# Patient Record
Sex: MALE | Race: WHITE
[De-identification: names, ages, dates, MRNs, and addresses within clinical notes are randomized per-mention and may not be internally consistent; named-entity substitution may affect disease eponyms.]

---

## 2019-11-29 NOTE — ED PHYSICIAN DOCUMENTATION
PD HPI MHE





- Stated complaint


Stated Complaint: ALOC





- Chief complaint


Chief Complaint: General





- History obtained from


History obtained from: Patient, EMS





- History of Present Illness


Primary symptom: Psychosis


Timing - onset: Unknown


Contributing factors: Other (homeless unable to contact family)


Similar symptoms before: Diagnosis (schizoaffective and bipolar)


Recently seen: Not recently seen





- Additional information


Additional information: 





29-year-old male is unable to give a clear picture of why he is in the emergency

department he is homeless and he appears sad.  He is complaining of a headache 

and he is hungry and tired.





Review of Systems


Constitutional: reports: Fatigue.  denies: Fever


Eyes: denies: Decreased vision


Ears: denies: Ear pain


Nose: reports: Congestion


Cardiac: denies: Chest pain / pressure, Palpitations


Respiratory: reports: Cough.  denies: Dyspnea


GI: denies: Vomiting





PD PAST MEDICAL HISTORY





- Past Medical History


Past Medical History: Yes


Cardiovascular: None


Respiratory: Asthma


Neuro: None


Endocrine/Autoimmune: None


GI: None


: None


HEENT: None


Psych: Bipolar disorder, Schizophrenia


Musculoskeletal: None, Chronic back pain


Derm: None





- Past Surgical History


Past Surgical History: Yes


General: Other


Ortho: Other





- Allergies


Allergies/Adverse Reactions: 


                                    Allergies











Allergy/AdvReac Type Severity Reaction Status Date / Time


 


orange AdvReac  Unknown Verified 11/29/19 20:45














- Social History


Does the pt smoke?: No


Smoking Status: Never smoker


Does the pt drink ETOH?: No


Does the pt have substance abuse?: No





- Immunizations


Immunizations are current?: No





- POLST


Patient has POLST: No





PD ED PE NORMAL





- Vitals


Vital signs reviewed: Yes (hypertensive )





- General


General: Well developed/nourished, Other (super sad affect with very quiet 

voice. )





- HEENT


HEENT: Atraumatic, PERRL, EOMI, Other (dry mucuos membranes Right TM is inflamed

with indistinct landmarks and the left is less involved with only mild umbo 

rounding.)





- Neck


Neck: Supple, no meningeal sign, No bony TTP





- Cardiac


Cardiac: RRR, No murmur





- Respiratory


Respiratory: No respiratory distress, Clear bilaterally





- Abdomen


Abdomen: Soft, Non tender





- Back


Back: No CVA TTP, No spinal TTP





- Derm


Derm: Normal color, Warm and dry, No rash





- Extremities


Extremities: No deformity, Other (trace edema to the feet bilaterally )





- Neuro


Neuro: No motor deficit, No sensory deficit, Other (speech is quiet and 

dysarthric)


Eye Opening: Spontaneous


Motor: Obeys Commands


Verbal: Confused


GCS Score: 14





- Psych


Psych: Other (mood is sad affect is sad)





Results





- Vitals


Vitals: 


                               Vital Signs - 24 hr











  11/29/19 11/29/19 11/29/19





  20:36 21:30 21:39


 


Temperature 37.3 C  


 


Heart Rate 93  


 


Respiratory 17 16 17





Rate   


 


Blood Pressure 141/102 H  


 


O2 Saturation 98  














  11/29/19 11/29/19 11/29/19





  21:57 22:12 22:49


 


Temperature 37.2 C  


 


Heart Rate 84  


 


Respiratory 20 17 17





Rate   


 


Blood Pressure 150/97 H  


 


O2 Saturation 99  














  11/29/19 11/30/19 11/30/19





  23:58 00:15 00:20


 


Temperature   


 


Heart Rate   


 


Respiratory 16 17 18





Rate   


 


Blood Pressure   


 


O2 Saturation   














  11/30/19 11/30/19 11/30/19





  01:20 02:33 02:49


 


Temperature   


 


Heart Rate   


 


Respiratory 17 17 17





Rate   


 


Blood Pressure   


 


O2 Saturation   














  11/30/19





  03:47


 


Temperature 


 


Heart Rate 


 


Respiratory 16





Rate 


 


Blood Pressure 


 


O2 Saturation 








                                     Oxygen











O2 Source                      Room air

















- Labs


Labs: 


                                Laboratory Tests











  11/29/19 11/29/19 11/29/19





  20:54 20:54 20:54


 


WBC  7.0  


 


RBC  4.11 L  


 


Hgb  12.9 L  


 


Hct  40.2 L  


 


MCV  97.8 H  


 


MCH  31.4 H  


 


MCHC  32.1  


 


RDW  12.5  


 


Plt Count  194  


 


MPV  9.2  


 


Neut # (Auto)  3.9  


 


Lymph # (Auto)  2.2  


 


Mono # (Auto)  0.6  


 


Eos # (Auto)  0.1  


 


Baso # (Auto)  0.0  


 


Absolute Nucleated RBC  0.00  


 


Nucleated RBC %  0.0  


 


Sodium   142 


 


Potassium   3.6 


 


Chloride   106 


 


Carbon Dioxide   28 


 


Anion Gap   8.0 


 


BUN   10 


 


Creatinine   0.9 


 


Estimated GFR (MDRD)   99 


 


Glucose   82 


 


Lactic Acid    1.6


 


Calcium   8.7 


 


Total Bilirubin   0.6 


 


AST   19 


 


ALT   16 


 


Alkaline Phosphatase   52 


 


Total Protein   7.0 


 


Albumin   4.0 


 


Globulin   3.0 


 


Albumin/Globulin Ratio   1.3 


 


Lipase   33 


 


Urine Color   


 


Urine Clarity   


 


Urine pH   


 


Ur Specific Gravity   


 


Urine Protein   


 


Urine Glucose (UA)   


 


Urine Ketones   


 


Urine Occult Blood   


 


Urine Nitrite   


 


Urine Bilirubin   


 


Urine Urobilinogen   


 


Ur Leukocyte Esterase   


 


Ur Microscopic Review   


 


Urine Culture Comments   


 


Urine Opiates Screen   


 


Ur Oxycodone Screen   


 


Urine Methadone Screen   


 


Ur Propoxyphene Screen   


 


Ur Barbiturates Screen   


 


Ur Tricyclics Screen   


 


Ur Phencyclidine Scrn   


 


Ur Amphetamine Screen   


 


U Methamphetamines Scrn   


 


U Benzodiazepines Scrn   


 


Urine Cocaine Screen   


 


U Cannabinoids Screen   


 


Ethyl Alcohol   < 5.0 














  11/29/19





  21:45


 


WBC 


 


RBC 


 


Hgb 


 


Hct 


 


MCV 


 


MCH 


 


MCHC 


 


RDW 


 


Plt Count 


 


MPV 


 


Neut # (Auto) 


 


Lymph # (Auto) 


 


Mono # (Auto) 


 


Eos # (Auto) 


 


Baso # (Auto) 


 


Absolute Nucleated RBC 


 


Nucleated RBC % 


 


Sodium 


 


Potassium 


 


Chloride 


 


Carbon Dioxide 


 


Anion Gap 


 


BUN 


 


Creatinine 


 


Estimated GFR (MDRD) 


 


Glucose 


 


Lactic Acid 


 


Calcium 


 


Total Bilirubin 


 


AST 


 


ALT 


 


Alkaline Phosphatase 


 


Total Protein 


 


Albumin 


 


Globulin 


 


Albumin/Globulin Ratio 


 


Lipase 


 


Urine Color  YELLOW


 


Urine Clarity  CLEAR


 


Urine pH  7.0


 


Ur Specific Gravity  1.020


 


Urine Protein  NEGATIVE


 


Urine Glucose (UA)  NEGATIVE


 


Urine Ketones  NEGATIVE


 


Urine Occult Blood  NEGATIVE


 


Urine Nitrite  NEGATIVE


 


Urine Bilirubin  NEGATIVE


 


Urine Urobilinogen  1 (NORMAL)


 


Ur Leukocyte Esterase  NEGATIVE


 


Ur Microscopic Review  NOT INDICATED


 


Urine Culture Comments  NOT INDICATED


 


Urine Opiates Screen  NEGATIVE


 


Ur Oxycodone Screen  NEGATIVE


 


Urine Methadone Screen  NEGATIVE


 


Ur Propoxyphene Screen  NEGATIVE


 


Ur Barbiturates Screen  NEGATIVE


 


Ur Tricyclics Screen  NEGATIVE


 


Ur Phencyclidine Scrn  NEGATIVE


 


Ur Amphetamine Screen  NEGATIVE


 


U Methamphetamines Scrn  NEGATIVE


 


U Benzodiazepines Scrn  NEGATIVE


 


Urine Cocaine Screen  NEGATIVE


 


U Cannabinoids Screen  POSITIVE H


 


Ethyl Alcohol 














PD MEDICAL DECISION MAKING





- ED course


Complexity details: considered differential, d/w patient


ED course: 





2-year-old homeless male appearing sad hungry and tired has a history of 

schizophrenia and bipolar disorder and he presents to the emergency department 

with no specific complaint his physical exam is otherwise unremarkable he does 

ask for food.  It is busy in the emergency department and we are unable to 

provide him with any other specific services in the middle of the night and he 

eventually elopes.





Departure





- Departure


Disposition: ED [facility]


Condition: Stable


Discharge Date/Time: 11/30/19 04:45

## 2019-12-04 NOTE — XRAY REPORT
Reason:  fall ankle pain

Procedure Date:  12/04/2019   

Accession Number:  [id number] / [id number]                    

Procedure:  XR  - Ankle 3 View RT CPT Code:  

 

***Final Report***

 

 

FULL RESULT:

 

 

EXAM:

RIGHT ANKLE RADIOGRAPHY

 

EXAM DATE: 12/4/2019 10:38 PM.

 

CLINICAL HISTORY: Fall ankle pain.

 

COMPARISON: None.

 

TECHNIQUE: 3 views.

 

FINDINGS:

 

Bones: No acute fracture. No suspicious osseous lesion.

 

Joints: No significant joint space narrowing. No dislocation.

 

Other: None.

IMPRESSION:

No acute osseous abnormality.

 

 

RADIA

## 2019-12-04 NOTE — ED PHYSICIAN DOCUMENTATION
PD HPI LOWER EXT INJURY





- Stated complaint


Stated Complaint: LEG PAIN FOR YEARS





- Chief complaint


Chief Complaint: Ext Problem





- History obtained from


History obtained from: Patient





- History of Present Illness


PD HPI LOW EXT INJURY LOCATION: Right, Ankle


Type of injury: Fall, Twist


Where injury occurred: Street


Timing - onset: Today


Timing - duration: Hours


Timing - details: Abrupt onset, Still present


Improved by: Rest


Worsened by: Moving, Palpating


Associated symptoms: No: Weakness, Numbness, Tingling, Swelling


Similar symptoms before: Diagnosis (ankle fracture)


Recently seen: Emergency Dept





- Additional information


Additional information: 





29-year-old homeless male with a history of schizoaffective disorder and bipolar

disorder was seen in the emergency department last week and eventually diagnosed

with  malingering. Today he has called the ambulance after having an alleged 

fall and complains of pain in his legs.  He has had a prior fracture of his left

distal tibia and has hardware in place from this fracture.  He is complaining of

pain to the ankles bilaterally and he is vague about how this happened and about

his specific symptoms. He does indicate he has had pain in his ankles for 

"years"





Review of Systems


Constitutional: denies: Fever


GI: denies: Vomiting


Musculoskeletal: reports: Extremity pain, Pain with weight bearing


Neurologic: denies: Generalized weakness, Focal weakness, Numbness





PD PAST MEDICAL HISTORY





- Past Medical History


Past Medical History: Yes


Cardiovascular: None


Respiratory: Asthma


Neuro: None


Endocrine/Autoimmune: None


GI: None


: None


HEENT: None


Psych: Bipolar disorder, Schizophrenia


Musculoskeletal: Chronic back pain


Derm: None





- Past Surgical History


Past Surgical History: Yes


General: Other


Ortho: Other





- Allergies


Allergies/Adverse Reactions: 


                                    Allergies











Allergy/AdvReac Type Severity Reaction Status Date / Time


 


orange AdvReac  Unknown Verified 12/04/19 21:43














- Social History


Does the pt smoke?: Yes


Smoking Status: Current every day smoker


Does the pt drink ETOH?: No


Does the pt have substance abuse?: No





- Immunizations


Immunizations are current?: No





- POLST


Patient has POLST: No





PD ED PE NORMAL





- Vitals


Vital signs reviewed: Yes (hypertensive mild )





- General


General: No acute distress, Well developed/nourished, Other (sad affect is 

improved from prior visit. )





- HEENT


HEENT: Atraumatic, PERRL, EOMI





- Respiratory


Respiratory: No respiratory distress





- Extremities


Extremities: Other (There is presents of dependant edema bilaterally and there 

is not specific swelling laterally or medially to the ankles. He complains of 

pain in the ankles when specifically asked. On exam this is to the medial and 

lateral malleolus and not to the proximal 5th or the dorusm of the foot. Distal 

n/v is intact. )





- Neuro


Neuro: Alert and oriented X 3, CNs 2-12 intact, No motor deficit, No sensory 

deficit, Other (speech is quiet)


Eye Opening: Spontaneous


Motor: Obeys Commands


Verbal: Oriented


GCS Score: 15





- Psych


Psych: Normal mood, Other (affect is blunted)





Results





- Vitals


Vitals: 


                               Vital Signs - 24 hr











  12/04/19 12/04/19 12/04/19





  21:41 22:00 22:32


 


Temperature 36.7 C  


 


Heart Rate 88  


 


Respiratory 17 16 16





Rate   


 


Blood Pressure 141/89 H  


 


O2 Saturation 98  














  12/04/19 12/04/19 12/04/19





  22:48 23:39 23:47


 


Temperature   


 


Heart Rate   


 


Respiratory 16 16 16





Rate   


 


Blood Pressure   


 


O2 Saturation   














  12/04/19 12/04/19





  23:49 23:54


 


Temperature  


 


Heart Rate  80


 


Respiratory 17 17





Rate  


 


Blood Pressure  


 


O2 Saturation  96








                                     Oxygen











O2 Source                      Room air

















- Rads (name of study)


  ** ankle R


Radiology: Prelim report reviewed (Impression: No acute osseous abnormality.), 

EMP read indepedently, See rad report





Procedures





- Splint (location)


  ** ankle bilaterally 


Splint applied by: Tech


Type of splint: Ankle airsplint


Other: Patient tolerated well, No complications, Neurovascular intact, Good 

alignment.  No: Crutches provided





PD MEDICAL DECISION MAKING





- ED course


Complexity details: reviewed old records, reviewed results, re-evaluated 

patient, considered differential, d/w patient


ED course: 





29-year-old homeless male with a complaint of ankle pain has a fairly benign 

exam he does have hardware in the left ankle, there is no sign of inflammation 

to the prior incision, there is no specific swelling. There is some dependent 

edema to the ankle and feet bilaterally and this is assumed to be secondary to 

excessive walking.  The patient is placed into ankle aircasts bilaterally and 

acknowledges improved comfort with ambulation after placement of the splints.





I suspect the patient again is malingering and has come to the emergency 

department to get out of the cold.  He is not specifically confronted with this 

tonight.





Departure





- Departure


Disposition: 01 Home, Self Care


Clinical Impression: 


Ankle sprain


Qualifiers:


 Encounter type: initial encounter Involved ligament of ankle: calcaneofibular 

ligament Laterality: unspecified laterality Qualified Code(s): S93.419A - Sprain

of calcaneofibular ligament of unspecified ankle, initial encounter





Condition: Stable


Instructions:  ED Sprain Ankle W X Ray


Follow-Up: 


[facility] [Provider Group]


Discharge Date/Time: 12/04/19 23:54

## 2019-12-04 NOTE — XRAY REPORT
Reason:  lateral pain injury

Procedure Date:  12/04/2019   

Accession Number:  [id number] / [id number]                    

Procedure:  XR  - Ankle 3 View LT CPT Code:  

 

***Final Report***

 

 

FULL RESULT:

 

 

EXAM:

LEFT ANKLE RADIOGRAPHY

 

EXAM DATE: 12/4/2019 11:07 PM.

 

CLINICAL HISTORY: Lateral pain injury.

 

COMPARISON: ANKLE 3 VIEW RT 12/04/2019 10:38 PM.

 

TECHNIQUE: 3 views.

 

FINDINGS:

Bones: Patient is status post open reduction internal fixation of distal 

tibia. There are plate and screws. The previous noted fracture lines have 

healed.

 

There are no acute changes of the bones of the distal tibia and fibula 

nor the ankle.

 

Joints: Normal. No effusion. No subluxations. The ankle mortise is 

normally aligned.

 

Soft Tissues: There is some soft tissue swelling around the ankle.

IMPRESSION:

1.Status post open reduction internal fixation previously noted of the 

distal tibia. No acute findings.

 

2. Surrounding soft tissue swelling.

 

RADIA

## 2019-12-27 NOTE — ED PHYSICIAN DOCUMENTATION
ED Addendum





- Addendum


Addendum: 


Pt signed out from Dr. Rico. 29 year old undomiciled male who was brought in 

by police for bizarre behavior, + for amphetamines. Medically cleared. No SI or 

HI. Plan is for patient to be seen by SW for resources and then appears 

reasonable for discharge. 





SW saw and provided resources. I re-evaluated patient, who has eaten their 

entire meal and is well-appearing. They deny SI or HI. Return precautions 

reviewed and  recommended avoiding drugs. Pt was discharged.

## 2019-12-27 NOTE — ED PHYSICIAN DOCUMENTATION
PD HPI MHE





- Stated complaint


Stated Complaint: MHE





- Chief complaint


Chief Complaint: MHE





- History obtained from


History obtained from: EMS, Other (patient provides little to HPI/ROS; does not 

answer most of my questions. Repeatedly falls asleep.)





- History of Present Illness


Timing - onset: Unknown


Recently seen: Emergency Dept





- Additional information


Additional information: 





[facility] who were contacted by police. Police had been contacted because patient was

in a laundromat exhibiting odd behavior. Patient is homeless and EMS arrives 

with patient as well as three large bags (large satchel and two garbage bags) of

patient's possessions. He is sleepy, wakens briefly with repeated verbal 

stimulus, answers few questions. He tells me the reason he is here is "cough", 

but he says "I don't remember" when I ask how long he has had this. When I ask 

him if he takes any prescription medications, he says "I wish". These are the 

only answers he provides me.





Review of Systems


Unable to obtain: Other (repeatedly falls asleep, but answers none of my HPI/ROS

questions except as noted above)


Respiratory: reports: Cough





PD PAST MEDICAL HISTORY





- Past Medical History


Past Medical History: Yes


Cardiovascular: None


Respiratory: Asthma


Neuro: None


Endocrine/Autoimmune: None


GI: None


: None


HEENT: None


Psych: Bipolar disorder, Schizophrenia


Musculoskeletal: Chronic back pain


Derm: None





- Past Surgical History


Past Surgical History: Yes


General: Other


Ortho: Other





- Present Medications


Home Medications: 


                                Ambulatory Orders











 Medication  Instructions  Recorded  Confirmed


 


No Known Home Medications  12/27/19 12/27/19














- Allergies


Allergies/Adverse Reactions: 


                                    Allergies











Allergy/AdvReac Type Severity Reaction Status Date / Time


 


orange AdvReac  Unknown Verified 12/27/19 03:00














- Social History


Does the pt smoke?: Yes


Smoking Status: Current every day smoker


Does the pt drink ETOH?: No


Does the pt have substance abuse?: No





- Immunizations


Immunizations are current?: No





- POLST


Patient has POLST: No





PD ED PE NORMAL





- Vitals


Vital signs reviewed: Yes





- General


General: No acute distress, Well developed/nourished, Other (sleeping, wakens 

briefly but falls back asleep quickly. Follows some simple commands when 

repeatedly instructed )





- HEENT


HEENT: PERRL, EOMI, Moist mucous membranes, Pharynx benign





- Neck


Neck: Supple, no meningeal sign





- Cardiac


Cardiac: RRR, No murmur





- Respiratory


Respiratory: No respiratory distress, Clear bilaterally





- Abdomen


Abdomen: Soft, Non tender, Other (old RUQ scar (mumbles unintelligible answer 

when I ask what this is from))





- Derm


Derm: Normal color, Warm and dry





- Extremities


Extremities: Other (old, healed left ankle anterior midline scar)





- Neuro


Eye Opening: To Voice


Motor: Obeys Commands (few)





Results





- Vitals


Vitals: 


                               Vital Signs - 24 hr











  12/27/19 12/27/19 12/27/19





  02:48 06:44 13:13


 


Temperature 36.6 C  36.9 C


 


Heart Rate 79 82 80


 


Respiratory 16 18 20





Rate   


 


Blood Pressure 123/88 H 128/92 H 102/48 L


 


O2 Saturation 100 98 99








                                     Oxygen











O2 Source                      Room air

















- Labs


Labs: 


                                Laboratory Tests











  12/27/19 12/27/19 12/27/19





  03:10 03:10 03:15


 


WBC  6.1  


 


RBC  4.22 L  


 


Hgb  13.4 L  


 


Hct  42.1  


 


MCV  99.8 H  


 


MCH  31.8 H  


 


MCHC  31.8 L  


 


RDW  13.1  


 


Plt Count  233  


 


MPV  9.0  


 


Neut # (Auto)  2.9  


 


Lymph # (Auto)  2.3  


 


Mono # (Auto)  0.6  


 


Eos # (Auto)  0.2  


 


Baso # (Auto)  0.0  


 


Absolute Nucleated RBC  0.00  


 


Nucleated RBC %  0.0  


 


Sodium   138 


 


Potassium   4.2 


 


Chloride   102 


 


Carbon Dioxide   27 


 


Anion Gap   9.0 


 


BUN   18 


 


Creatinine   0.8 


 


Estimated GFR (MDRD)   114 


 


Glucose   101 H 


 


Calcium   9.5 


 


Urine Opiates Screen    NEGATIVE


 


Ur Oxycodone Screen    NEGATIVE


 


Urine Methadone Screen    NEGATIVE


 


Ur Propoxyphene Screen    NEGATIVE


 


Ur Barbiturates Screen    NEGATIVE


 


Ur Tricyclics Screen    NEGATIVE


 


Ur Phencyclidine Scrn    NEGATIVE


 


Ur Amphetamine Screen    POSITIVE H


 


U Methamphetamines Scrn    POSITIVE H


 


U Benzodiazepines Scrn    NEGATIVE


 


Urine Cocaine Screen    NEGATIVE


 


U Cannabinoids Screen    POSITIVE H


 


Ethyl Alcohol   < 5.0 














PD MEDICAL DECISION MAKING





- ED course


Complexity details: reviewed old records, reviewed results, re-evaluated 

patient, considered differential


ED course: 





[facility] ED visit in past month. He was here one month ago with similar 

presentation, eventually eloped. Subsequently returned c/o leg pain. IAN form 

reflects 14 [place] ED visits over past 12 months to 7 different EDs, with frequent 

c/o leg pain. 


SW consulted and they met with patient and options for outpatient resources were

discussed, although patient subsequently stopped talking to the  

and insisted on keeping the blanket over his head. 


Case turned over to Dr. Alexandre at end of my shift. Before I left ED, patient 

was suddenly awake, alert, eating food and RN informs me that patient is 

communicating and wants to speak with SW again, and this is being arranged.





Departure





- Departure


Disposition: 01 Home, Self Care


Clinical Impression: 


 Drug use





Condition: Good


Instructions:  Abuse Meth Abuse and Addiction


Comments: 


Please follow-up on the resources that social provided with you.  Please abstain

from drug and alcohol use.  If you are developing worsening symptoms return to 

the ED.  It is important that you establish with a primary care provide.r


Discharge Date/Time: 12/27/19 14:20

## 2020-02-26 NOTE — ED PHYSICIAN DOCUMENTATION
PD HPI ABD PAIN





- Stated complaint


Stated Complaint: ABD PX/BLACK STOOL/FEVER





- Chief complaint


Chief Complaint: Abd Pain





- History obtained from


History obtained from: Patient





- History of Present Illness


Timing - details: Other (SEVERAL DAYS AGO PER PATIENT)


Location: Epigastric


Radiation: No: Chest


Associated symptoms: Nausea, Vomiting





- Additional information


Additional information: 





30 YEAR OLD MALE WITH HX OF CHRONIC NSAIDS USE FOR CHRONIC LEFT ANKLE PAIN, 

SWELLING FROM PREVIOUS FRACTURE AND SURGICAL REPAIR A YEAR AGO, PRESENTS TO THE 

EMERGENCY DEPARTMENT BECAUSE OF LEFT BLACK, TARRY STOOL FOR THE LAST SEVERAL 

DAYS. HE REPORTED OF VOMITING BLOOD SEVERAL DAYS AGO BUT DID NOT HAVE A RIDE TO 

COME TO THE EMERGENCY DEPARTMENT. HE REPORTED OF EPIGASTRIC ABDOMINAL PAIN. HE 

DENIES SYNCOPE OR NEAR SYNCOPE. 





Review of Systems


Constitutional: denies: Fever, Chills


Nose: denies: Rhinorrhea / runny nose


Cardiac: denies: Chest pain / pressure


Respiratory: denies: Dyspnea, Cough


GI: reports: Abdominal Pain, Nausea, Vomiting, Hematemesis, Bloody / black stool


Skin: denies: Rash


Musculoskeletal: denies: Neck pain, Back pain


Neurologic: denies: Generalized weakness, Syncope, Confused





PD PAST MEDICAL HISTORY





- Past Medical History


Cardiovascular: None


Respiratory: Asthma


Neuro: None


Endocrine/Autoimmune: None


GI: None


: None


HEENT: None


Psych: Bipolar disorder, Schizophrenia


Musculoskeletal: Chronic back pain


Derm: None





- Past Surgical History


Past Surgical History: Yes


General: Other


Ortho: Other





- Present Medications


Home Medications: 


                                Ambulatory Orders











 Medication  Instructions  Recorded  Confirmed


 


Pantoprazole Sodium [Protonix] 40 mg PO BID #60 tablet. 02/26/20 


 


Sucralfate [Carafate] 1 gm PO ACHS #120 tablet 02/26/20 














- Allergies


Allergies/Adverse Reactions: 


                                    Allergies











Allergy/AdvReac Type Severity Reaction Status Date / Time


 


orange AdvReac  Unknown Verified 02/26/20 18:15














- Social History


Does the pt smoke?: Yes


Smoking Status: Current every day smoker


Does the pt drink ETOH?: No


Does the pt have substance abuse?: No





- Immunizations


Immunizations are current?: No





- POLST


Patient has POLST: No





PD ED PE NORMAL





- Vitals


Vital signs reviewed: Yes





- General


General: Alert and oriented X 3





- HEENT


HEENT: Atraumatic





- Neck


Neck: Supple, no meningeal sign





- Cardiac


Cardiac: RRR





- Respiratory


Respiratory: No respiratory distress





- Abdomen


Abdomen: Normal bowel sounds, Soft





- Rectal


Rectal: Other (MELANOTIC STOOL NOTED.)





- Back


Back: No CVA TTP





- Derm


Derm: Normal color, Warm and dry





- Extremities


Extremities: No deformity





- Neuro


Neuro: Alert and oriented X 3, No motor deficit, No sensory deficit


Eye Opening: Spontaneous


Motor: Obeys Commands


Verbal: Oriented


GCS Score: 15





Results





- Vitals


Vitals: 


                                     Oxygen











O2 Source                      Room air

















- Labs


Labs: 


                                  Microbiology











 02/26/20 20:00 Occult Blood - Final





 Stool 








                                Laboratory Tests











  02/26/20 02/26/20 02/26/20





  18:33 18:33 18:33


 


WBC  7.4  


 


RBC  3.49 L  


 


Hgb  10.9 L  


 


Hct  34.1 L  


 


MCV  97.7 H  


 


MCH  31.2 H  


 


MCHC  32.0  


 


RDW  12.7  


 


Plt Count  244  


 


MPV  8.5  


 


Neut # (Auto)  3.9  


 


Lymph # (Auto)  2.8  


 


Mono # (Auto)  0.5  


 


Eos # (Auto)  0.1  


 


Baso # (Auto)  0.0  


 


Absolute Nucleated RBC  0.03  


 


Nucleated RBC %  0.4  


 


PT    13.1 H


 


INR    1.2


 


APTT    31.4


 


Sodium   133 L 


 


Potassium   3.8 


 


Chloride   98 L 


 


Carbon Dioxide   26 


 


Anion Gap   9.0 


 


BUN   38 H 


 


Creatinine   0.8 


 


Estimated GFR (MDRD)   113 


 


Glucose   110 H 


 


Calcium   9.0 


 


Total Bilirubin   0.5 


 


AST   22 


 


ALT   27 


 


Alkaline Phosphatase   37 L 


 


Total Protein   7.0 


 


Albumin   3.8 


 


Globulin   3.2 


 


Albumin/Globulin Ratio   1.2 


 


Lipase   35 


 


Urine Color   


 


Urine Clarity   


 


Urine pH   


 


Ur Specific Gravity   


 


Urine Protein   


 


Urine Glucose (UA)   


 


Urine Ketones   


 


Urine Occult Blood   


 


Urine Nitrite   


 


Urine Bilirubin   


 


Urine Urobilinogen   


 


Ur Leukocyte Esterase   


 


Ur Microscopic Review   


 


Urine Culture Comments   


 


Blood Type   


 


Blood Type Recheck   


 


Antibody Screen   














  02/26/20 02/26/20 02/26/20





  19:28 20:15 21:35


 


WBC   


 


RBC   


 


Hgb   


 


Hct   


 


MCV   


 


MCH   


 


MCHC   


 


RDW   


 


Plt Count   


 


MPV   


 


Neut # (Auto)   


 


Lymph # (Auto)   


 


Mono # (Auto)   


 


Eos # (Auto)   


 


Baso # (Auto)   


 


Absolute Nucleated RBC   


 


Nucleated RBC %   


 


PT   


 


INR   


 


APTT   


 


Sodium   


 


Potassium   


 


Chloride   


 


Carbon Dioxide   


 


Anion Gap   


 


BUN   


 


Creatinine   


 


Estimated GFR (MDRD)   


 


Glucose   


 


Calcium   


 


Total Bilirubin   


 


AST   


 


ALT   


 


Alkaline Phosphatase   


 


Total Protein   


 


Albumin   


 


Globulin   


 


Albumin/Globulin Ratio   


 


Lipase   


 


Urine Color   YELLOW 


 


Urine Clarity   CLEAR 


 


Urine pH   5.5 


 


Ur Specific Gravity   1.025 


 


Urine Protein   NEGATIVE 


 


Urine Glucose (UA)   NEGATIVE 


 


Urine Ketones   NEGATIVE 


 


Urine Occult Blood   NEGATIVE 


 


Urine Nitrite   NEGATIVE 


 


Urine Bilirubin   NEGATIVE 


 


Urine Urobilinogen   0.2 (NORMAL) 


 


Ur Leukocyte Esterase   NEGATIVE 


 


Ur Microscopic Review   NOT INDICATED 


 


Urine Culture Comments   NOT INDICATED 


 


Blood Type  O POSITIVE  


 


Blood Type Recheck    O POSITIVE


 


Antibody Screen  NEGATIVE  














  02/26/20





  21:35


 


WBC 


 


RBC 


 


Hgb  10.2 L


 


Hct  30.8 L


 


MCV 


 


MCH 


 


MCHC 


 


RDW 


 


Plt Count 


 


MPV 


 


Neut # (Auto) 


 


Lymph # (Auto) 


 


Mono # (Auto) 


 


Eos # (Auto) 


 


Baso # (Auto) 


 


Absolute Nucleated RBC 


 


Nucleated RBC % 


 


PT 


 


INR 


 


APTT 


 


Sodium 


 


Potassium 


 


Chloride 


 


Carbon Dioxide 


 


Anion Gap 


 


BUN 


 


Creatinine 


 


Estimated GFR (MDRD) 


 


Glucose 


 


Calcium 


 


Total Bilirubin 


 


AST 


 


ALT 


 


Alkaline Phosphatase 


 


Total Protein 


 


Albumin 


 


Globulin 


 


Albumin/Globulin Ratio 


 


Lipase 


 


Urine Color 


 


Urine Clarity 


 


Urine pH 


 


Ur Specific Gravity 


 


Urine Protein 


 


Urine Glucose (UA) 


 


Urine Ketones 


 


Urine Occult Blood 


 


Urine Nitrite 


 


Urine Bilirubin 


 


Urine Urobilinogen 


 


Ur Leukocyte Esterase 


 


Ur Microscopic Review 


 


Urine Culture Comments 


 


Blood Type 


 


Blood Type Recheck 


 


Antibody Screen 














PD MEDICAL DECISION MAKING





- ED course


Complexity details: re-evaluated patient, d/w patient


ED course: 





30 YEAR OLD MALE PRESENTED TO THE EMERGENCY DEPARTMENT WITH BLACK, TARRY STOOL. 

HX OF NSAID's USE. PATIENT REMAINED HEMODYNAMICALLY STABLE.  RECTAL EXAM SHOWED 

DARK COLORED STOOL THAT WAS GUAIAC POSITIVE. H/H WERE DECREASED FROM MONTHS AGO.

 IV WAS STARTED AND PATIENT WAS GIVEN IV PROTONIX. I DISCUSSED THE CASE WITH DR. JACOB, GENERAL SURGEON. SHE DID NOT RECOMMENDED ADMISSION. SHE BELIEVED THE 

CAUSE IS LIKELY PEPTIC ULCER DISEASE WITH CHRONIC NSAIDS USE. SHE RECOMMENDED 

THE PATIENT TO STOP NSAIDS AND TAKE PROTONIX 40 MG TWICE  DAY FOR 2 WEEKS THEN 

DOWN TO PROTONIX 40 MG DAILY. SHE ALSO RECOMMENDED CARAFATE 4 TIMES DAILY.  THE 

BLEEDING LIKELY HAS OR WILL RESOLVE SPONTANEOUSLY.  REPEAT H/H WERE STABLE.  

PATIENT HAD NO EPISODES OF HEMATAEMESIS HERE IN THE ED. HE REMAINED STABLE. 





DIAGNOSIS AND TREATMENT PLAN WERE DISCUSSED. OUTPATIENT FOLLOW UP WITH DR. JACOB 

AT THE OFFICE AS SOON AS POSSIBLE IN 3-5 DAYS WERE RECOMMENDED. STRICT RETURN 

INSTRUCTIONS WERE GIVEN.  PATIENT WAS DISCHARGED IN STABLE CONDITION.   











Departure





- Departure


Disposition: 01 Home, Self Care


Clinical Impression: 


 GI bleeding





Condition: Stable


Instructions:  ED Bleed UGI Stable


Follow-Up: 


GELY Jacob MD [Provider Admit Priv/Credential] - Within 3 Days


Prescriptions: 


Pantoprazole Sodium [Protonix] 40 mg PO BID #60 tablet.dr


Sucralfate [Carafate] 1 gm PO ACHS #120 tablet


Comments: 


PLEASE RETURN TO THE EMERGENCY DEPARTMENT IF YOU EXPERIENCE DIZZINESS, EPISODES 

OF PASSING OUT, VOMITING BLOOD CLOTS, BRIGHT RED BLOOD, CHEST PAIN, SHORTNESS OF

 BREATH OR ANY NEW OR CONCERNING SYMPTOMS. 





PLEASE STOP USING IBUPROFEN OR OTHER NSAIDS. 








Discharge Date/Time: 02/26/20 22:08

## 2020-03-06 NOTE — ED PHYSICIAN DOCUMENTATION
History of Present Illness





- Stated complaint


Stated Complaint: FOLLOW UP





- Chief complaint


Chief Complaint: Abd Pain





- History obtained from


History obtained from: Patient





- History of Present Illness


Timing: How many weeks ago ("several weeks" (per patient))


Pain level now: 3 (BLE)





- Additonal information


Additional information: 





T+R 9 days ago from this ED for LGIB, was provided Dr. Jacob's information for 

f/u. Patient says he was unable to obtain f/u because he could not get a ride 

until tonight. Now that he has procured a ride, he says he is here in the ED for

the f/u that was recommended he obtain when he was last discharged. I point out 

that his IAN form indicates he was in an  ED in [place] 2 days ago for similar 

c/o. He says the ride he had was going to [place], so he went to that ED at that 

time. He asks for information for f/u as well as "something for my leg pain"; he

says he has chronic BLE pain. He says a pharmacist told him that there is a 

topical cream that would help with leg pain and swelling, but he does not know 

what it is.








Review of Systems


Constitutional: denies: Fever, Chills, Sweats


Respiratory: denies: Dyspnea


GI: denies: Abdominal Pain, Nausea, Vomiting, Hematemesis, Bloody / black stool


Musculoskeletal: reports: Extremity pain





PD PAST MEDICAL HISTORY





- Past Medical History


Cardiovascular: None


Respiratory: Asthma


Neuro: None


Endocrine/Autoimmune: None


GI: None


: None


HEENT: None


Psych: Bipolar disorder, Schizophrenia


Musculoskeletal: Chronic back pain


Derm: None





- Past Surgical History


Past Surgical History: Yes


General: Other


Ortho: Other





- Present Medications


Home Medications: 


                                Ambulatory Orders











 Medication  Instructions  Recorded  Confirmed


 


Pantoprazole Sodium [Protonix] 40 mg PO BID #60 tablet. 02/26/20 


 


Sucralfate [Carafate] 1 gm PO ACHS #120 tablet 02/26/20 


 


Benzonatate [Tessalon Perle] 100 - 200 mg PO TID PRN #30 capsule 03/06/20 














- Allergies


Allergies/Adverse Reactions: 


                                    Allergies











Allergy/AdvReac Type Severity Reaction Status Date / Time


 


orange AdvReac  Unknown Verified 03/06/20 19:00














- Social History


Does the pt smoke?: Yes


Smoking Status: Current every day smoker


Does the pt drink ETOH?: No


Does the pt have substance abuse?: No





- Immunizations


Immunizations are current?: No





- POLST


Patient has POLST: No





PD ED PE NORMAL





- Vitals


Vital signs reviewed: Yes





- General


General: Alert and oriented X 3, No acute distress, Well developed/nourished





- HEENT


HEENT: Moist mucous membranes





- Cardiac


Cardiac: RRR, No murmur





- Respiratory


Respiratory: No respiratory distress, Clear bilaterally





- Abdomen


Abdomen: Soft, Non tender





- Derm


Derm: Normal color, Warm and dry





- Extremities


Extremities: Normal ROM s pain, No edema





Results





- Vitals


Vitals: 


                               Vital Signs - 24 hr











  03/06/20 03/06/20





  19:00 19:37


 


Temperature 37.6 C H 37.1 C


 


Heart Rate 110 H 104 H


 


Respiratory 18 16





Rate  


 


Blood Pressure 124/84 H 124/74


 


O2 Saturation 98 99








                                     Oxygen











O2 Source                      Room air

















PD MEDICAL DECISION MAKING





- ED course


Complexity details: reviewed old records, considered differential, d/w patient





Departure





- Departure


Disposition: 01 Home, Self Care


Clinical Impression: 


 Cough, Leg pain, bilateral





Condition: Good


Instructions:  ED Upper Resp Infec No  Abx Tx, ED Muscle Pain Leg Cramps


Follow-Up: 


GELY Jacob MD [Provider Admit Priv/Credential] - 


Prescriptions: 


Benzonatate [Tessalon Perle] 100 - 200 mg PO TID PRN #30 capsule


 PRN Reason: Cough


Discharge Date/Time: 03/06/20 19:37

## 2020-05-15 ENCOUNTER — HOSPITAL ENCOUNTER (OUTPATIENT)
Dept: HOSPITAL 76 - EMS | Age: 30
Discharge: ANOTHER HOSPITAL | End: 2020-05-15
Attending: SURGERY
Payer: MEDICAID

## 2020-05-15 ENCOUNTER — HOSPITAL ENCOUNTER (EMERGENCY)
Dept: HOSPITAL 76 - ED | Age: 30
Discharge: HOME | End: 2020-05-15
Payer: MEDICAID

## 2020-05-15 VITALS — SYSTOLIC BLOOD PRESSURE: 129 MMHG | DIASTOLIC BLOOD PRESSURE: 74 MMHG

## 2020-05-15 DIAGNOSIS — B35.3: Primary | ICD-10-CM

## 2020-05-15 DIAGNOSIS — Z59.0: ICD-10-CM

## 2020-05-15 DIAGNOSIS — Z76.5: ICD-10-CM

## 2020-05-15 DIAGNOSIS — F17.200: ICD-10-CM

## 2020-05-15 DIAGNOSIS — F25.0: ICD-10-CM

## 2020-05-15 DIAGNOSIS — R05: Primary | ICD-10-CM

## 2020-05-15 PROCEDURE — 99283 EMERGENCY DEPT VISIT LOW MDM: CPT

## 2020-05-15 SDOH — ECONOMIC STABILITY - HOUSING INSECURITY: HOMELESSNESS: Z59.0

## 2020-05-15 NOTE — ED PHYSICIAN DOCUMENTATION
History of Present Illness





- Stated complaint


Stated Complaint: SICK





- Chief complaint


Chief Complaint: Ext Problem





- History obtained from


History obtained from: Patient





- History of Present Illness


Timing: Today





- Additonal information


Additional information: 





30-year-old homeless male was picked up in [place] by ambulance after police 

were called for some when sharing a beer.  The patient asked to be transported 

to the hospital stating that he felt sick.  The patient does have a history of 

malingering and has multiple ER visits for ankle pain and foot pain.  He has had

prior surgery to his left ankle.  He uses these excuses for ED visits 

frequently.  He has a history of schizoaffective disorder.





Review of Systems


Constitutional: reports: Fatigue.  denies: Fever


Eyes: denies: Decreased vision


Nose: reports: Congestion


Throat: denies: Sore throat


Cardiac: denies: Chest pain / pressure


Respiratory: denies: Cough


GI: denies: Vomiting


Musculoskeletal: reports: Extremity pain (feet hurt)


Neurologic: denies: Focal weakness, Numbness, Difficulty speaking





PD PAST MEDICAL HISTORY





- Past Medical History


Cardiovascular: None


Respiratory: Asthma


Neuro: None


Endocrine/Autoimmune: None


GI: None


: None


HEENT: None


Psych: Bipolar disorder, Schizophrenia


Musculoskeletal: Chronic back pain


Derm: None





- Past Surgical History


Past Surgical History: Yes


General: Other


Ortho: Other





- Present Medications


Home Medications: 


                                Ambulatory Orders











 Medication  Instructions  Recorded  Confirmed


 


Pantoprazole Sodium [Protonix] 40 mg PO BID #60 tablet. 02/26/20 


 


Sucralfate [Carafate] 1 gm PO ACHS #120 tablet 02/26/20 


 


Benzonatate [Tessalon Perle] 100 - 200 mg PO TID PRN #30 capsule 03/06/20 


 


Terbinafine HCl [Terbinafine] 1 gm TP BID #15 gm 05/15/20 














- Allergies


Allergies/Adverse Reactions: 


                                    Allergies











Allergy/AdvReac Type Severity Reaction Status Date / Time


 


orange AdvReac  Unknown Verified 05/15/20 09:59














- Social History


Does the pt smoke?: Yes


Smoking Status: Current every day smoker


Does the pt drink ETOH?: No


Does the pt have substance abuse?: No





- Immunizations


Immunizations are current?: No





- POLST


Patient has POLST: No





PD ED PE NORMAL





- Vitals


Vital signs reviewed: Yes (normal )





- General


General: Alert and oriented X 3, Well developed/nourished, Other (overweight 29 y/o male with deep frontalis grooves)





- HEENT


HEENT: Atraumatic, PERRL, EOMI





- Cardiac


Cardiac: RRR, No murmur





- Respiratory


Respiratory: No respiratory distress, Clear bilaterally





- Abdomen


Abdomen: Soft, Non tender





- Derm


Derm: Normal color, Warm and dry, No rash





- Extremities


Extremities: No deformity, No edema, Other (There is no inflammation to the 

incision from the prior ankle fracture repair on the left side there is no 

inflammation to either foot on the bottom of each foot however there is evidence

 of skin desquamation consistent with athletes feet.)





- Neuro


Neuro: Alert and oriented X 3, CNs 2-12 intact, No motor deficit, No sensory 

deficit, Normal speech


Eye Opening: Spontaneous


Motor: Obeys Commands


Verbal: Oriented


GCS Score: 15





- Psych


Psych: Normal mood, Other (Affect is sad permanently)





Results





- Vitals


Vitals: 


                               Vital Signs - 24 hr











  05/15/20





  09:56


 


Temperature 37.4 C


 


Heart Rate 86


 


Respiratory 18





Rate 


 


Blood Pressure 129/74


 


O2 Saturation 99








                                     Oxygen











O2 Source                      Room air

















PD MEDICAL DECISION MAKING





- ED course


Complexity details: reviewed old records, considered differential, d/w patient


ED course: 





30-year-old male with a history of malingering is coming to the emergency 

department today complaining of pain in his feet and he does have some athletes 

feet feet.  He is wearing some heavy boots which are supportive for his ankles 

he does not appear to have any issue with the incision from his prior surgery or

 any excessive swelling or specific tenderness. He is malingering here in the 

emergency department today.





Departure





- Departure


Disposition: 01 Home, Self Care


Clinical Impression: 


Athletes foot


Qualifiers:


 Laterality: bilateral Qualified Code(s): B35.3 - Tinea pedis





Condition: Stable


Instructions:  Terbinafine skin cream gel or topical solution


Follow-Up: 


[facility] [Provider Group]


Prescriptions: 


Terbinafine HCl [Terbinafine] 1 gm TP BID #15 gm


Discharge Date/Time: 05/15/20 11:47